# Patient Record
(demographics unavailable — no encounter records)

---

## 2025-01-07 NOTE — REASON FOR VISIT
[Follow-Up ] : a  follow-up for [FreeTextEntry3] : medical and developmental issues that may arise during developmental stages.   As NICU courses vary for each child and NICU course does not necessarily always coincide with each individual developmental issue, careful follow up is recommended to ensure that each NICU graduate is evaluated for delays associated with prematurity and if delays are noted that delays are treated immediately with either referrals for medical interventions or educational therapy services. Patient was accompanied by mother.

## 2025-01-07 NOTE — PLAN
[FreeTextEntry1] : - Advised to stop using push walker as will impeded development of normal walking - F/up in 9 months, or sooner if not walking by 18 months adjusted age (~May 2025).  - RoR book given and daily reading as well as narration encouraged to promote language development.

## 2025-01-07 NOTE — PHYSICAL EXAM
[Creep] : creeps [Crawl] : crawls  [Come to Sit] : comes to sit [Pull to Stand] : pulls to stand [Cruise] : cruises [Unfisted] : unfisted [Manipulates Fingers] : manipulates fingers [Transfer] : transfers objects [Unilateral Reach/Grasp] : unilaterally reaches/grasps  [Mature Pincer] : has mature pincer [Voluntary Release] : voluntary release  [Finger Feeding] : finger feeding  [Cup] : uses a cup [Alert To Sounds] : alert to sounds [Soothes When Picked Up] : soothes when picked up  [Social Smile] : has a social smile [Orients To Voice] : orients to voice [Gesture Language] : gestures language [Understands "No"] : understands "No" [1 Step Command with Gesture] : follows 1 step commands with gesture [[place]] : coos [Laughs Aloud] : laughs aloud ["Lyric Costello"] : lyric vaughn [Razzing] : razzing [Babbling] : babbling ["Lazarus" Appropriately] : says "Lazarus" appropriately ["Mama" Appropriately] : says "Mama" appropriately [1 Word Other Than Ma/Da] : uses 1 word other than ma/da [Vocabulary Of ___ Words] : has a vocabulary of [unfilled] words [Normal] : sensation is intact to light touch [Mature Jargoning] : uses immature jargoning

## 2025-01-07 NOTE — HISTORY OF PRESENT ILLNESS
[Gestational Age: ___] : Gestational Age in Weeks: [unfilled] [Chronological Age: ___] : Chronological Age in Months: [unfilled] [Corrected Age: ___] : Corrected Age: [unfilled] [___times per Day] : frequency of [unfilled] times per day [Normal] : normal [No Feeding Issues] : no feeding issues. [None] : none [de-identified] : PCP: Dr. Jhonatan Gaviria. 15 mo WCV with no concerns.  She stays home with a nanny and plays with other children. Goes on walks and attends a music class weekly. Planning to start  in the Fall of 2025.    Not walking yet but does hold on to others and cruises well with walker. Using a push walker. Sitting up and crawling well. No reported falls or injuries.  [de-identified] : Drinks a cow's milk bottle twice daily (10-12 oz total/day). Otherwise, drinks water with solid foods.  [de-identified] : Loves pizza and pasta. Sometimes eats chicken (does not always eat it) and sweet potatoes. Likes fruits, veggies depend on how it is cooked, will eat broccoli patties.  [FreeTextEntry4] : No issues [de-identified] : 7pm - 7am

## 2025-01-07 NOTE — REVIEW OF SYSTEMS
[Negative] : Psychological  [Lethargy] : no lethargy [Fever] : no fever [Eye Redness] : no redness [Swollen Eyelids] : no ~T ~L swollen eyelids [Rhinorrhea] : no rhinorrhea [Sneezing] : no sneezing [Nasal Congestion] : no nasal congestion [Edema] : no edema [Fatigue with Feeding] : no fatigue with feeding [Difficulty Breathing] : no dyspena [Cough] : no cough [Vomiting] : no vomiting [Diarrhea] : no diarrhea [Arching with Feeds] : no arching with feeds [Reflux] : no reflux [Abnormal Movements] : no abnormal movements [Abnormal Tone] : no abnormal tone [Concerns with Eye Contact] : no concerns with eye contact [Concerns with Head Size/Shape] : no concerns with head size/shape [Concerns with Appropriate Socialization] : no concerns with appropriate socialization [Dec Urine Output] : no oliguria [Skin Color Concerns] : no skin color concerns [Rash] : no rash

## 2025-01-07 NOTE — HISTORY OF PRESENT ILLNESS
[Gestational Age: ___] : Gestational Age in Weeks: [unfilled] [Chronological Age: ___] : Chronological Age in Months: [unfilled] [Corrected Age: ___] : Corrected Age: [unfilled] [___times per Day] : frequency of [unfilled] times per day [Normal] : normal [No Feeding Issues] : no feeding issues. [None] : none [de-identified] : PCP: Dr. Jhonatan Gaviria. 15 mo WCV with no concerns.  She stays home with a nanny and plays with other children. Goes on walks and attends a music class weekly. Planning to start  in the Fall of 2025.    Not walking yet but does hold on to others and cruises well with walker. Using a push walker. Sitting up and crawling well. No reported falls or injuries.  [de-identified] : Drinks a cow's milk bottle twice daily (10-12 oz total/day). Otherwise, drinks water with solid foods.  [de-identified] : Loves pizza and pasta. Sometimes eats chicken (does not always eat it) and sweet potatoes. Likes fruits, veggies depend on how it is cooked, will eat broccoli patties.  [FreeTextEntry4] : No issues [de-identified] : 7pm - 7am

## 2025-01-07 NOTE — BIRTH HISTORY
[FreeTextEntry3] : The patient was born at 34 weeks gestation. Birth Weight: 1970 grams. Pregnancy complicated by: GDMA1. There were no delivery complications.  delivery with CPAP at 7 MOL, continued on BCPAP in NICU for RDS and weaned to RA on DOL2.

## 2025-01-07 NOTE — PHYSICAL EXAM
[Creep] : creeps [Crawl] : crawls  [Come to Sit] : comes to sit [Pull to Stand] : pulls to stand [Cruise] : cruises [Unfisted] : unfisted [Manipulates Fingers] : manipulates fingers [Transfer] : transfers objects [Unilateral Reach/Grasp] : unilaterally reaches/grasps  [Mature Pincer] : has mature pincer [Voluntary Release] : voluntary release  [Finger Feeding] : finger feeding  [Cup] : uses a cup [Alert To Sounds] : alert to sounds [Soothes When Picked Up] : soothes when picked up  [Social Smile] : has a social smile [Orients To Voice] : orients to voice [Gesture Language] : gestures language [Understands "No"] : understands "No" [1 Step Command with Gesture] : follows 1 step commands with gesture [[place]] : coos [Laughs Aloud] : laughs aloud ["Lyric Costello"] : lyric vaughn [Razzing] : razzing [Babbling] : babbling ["Lazarus" Appropriately] : says "Lazarus" appropriately ["Mama" Appropriately] : says "Mama" appropriately [1 Word Other Than Ma/Da] : uses 1 word other than ma/da [Vocabulary Of ___ Words] : has a vocabulary of [unfilled] words [Normal] : sensation is intact to light touch [Mature Jargoning] : uses immature jargoning